# Patient Record
Sex: FEMALE | Race: WHITE
[De-identification: names, ages, dates, MRNs, and addresses within clinical notes are randomized per-mention and may not be internally consistent; named-entity substitution may affect disease eponyms.]

---

## 2020-06-27 ENCOUNTER — HOSPITAL ENCOUNTER (EMERGENCY)
Dept: HOSPITAL 96 - M.ERS | Age: 50
Discharge: HOME | End: 2020-06-27
Payer: COMMERCIAL

## 2020-06-27 VITALS — WEIGHT: 139.99 LBS | BODY MASS INDEX: 25.76 KG/M2 | HEIGHT: 62 IN

## 2020-06-27 VITALS — DIASTOLIC BLOOD PRESSURE: 93 MMHG | SYSTOLIC BLOOD PRESSURE: 186 MMHG

## 2020-06-27 DIAGNOSIS — N39.0: Primary | ICD-10-CM

## 2020-06-27 DIAGNOSIS — R11.2: ICD-10-CM

## 2020-06-27 DIAGNOSIS — Z88.8: ICD-10-CM

## 2020-06-27 DIAGNOSIS — N28.82: ICD-10-CM

## 2020-06-27 DIAGNOSIS — D25.9: ICD-10-CM

## 2020-06-27 DIAGNOSIS — Z88.6: ICD-10-CM

## 2020-06-27 LAB
ABSOLUTE BASOPHILS: 0.1 THOU/UL (ref 0–0.2)
ABSOLUTE EOSINOPHILS: 0.1 THOU/UL (ref 0–0.7)
ABSOLUTE MONOCYTES: 0.6 THOU/UL (ref 0–1.2)
ALBUMIN SERPL-MCNC: 4.1 G/DL (ref 3.4–5)
ALP SERPL-CCNC: 112 U/L (ref 46–116)
ALT SERPL-CCNC: 77 U/L (ref 30–65)
ANION GAP SERPL CALC-SCNC: 7 MMOL/L (ref 7–16)
AST SERPL-CCNC: 70 U/L (ref 15–37)
BACTERIA-REFLEX: (no result) /HPF
BASOPHILS NFR BLD AUTO: 1 %
BILIRUB SERPL-MCNC: 0.4 MG/DL
BILIRUB UR-MCNC: (no result) MG/DL
BUN SERPL-MCNC: 14 MG/DL (ref 7–18)
CALCIUM OXALATE: (no result) /LPF
CALCIUM SERPL-MCNC: 9.1 MG/DL (ref 8.5–10.1)
CHLORIDE SERPL-SCNC: 102 MMOL/L (ref 98–107)
CO2 SERPL-SCNC: 30 MMOL/L (ref 21–32)
COLOR UR: (no result)
CREAT SERPL-MCNC: 1.1 MG/DL (ref 0.6–1.3)
EOSINOPHIL NFR BLD: 1.7 %
GLUCOSE SERPL-MCNC: 159 MG/DL (ref 70–99)
GRANULOCYTES NFR BLD MANUAL: 73.7 %
HCT VFR BLD CALC: 47 % (ref 37–47)
HGB BLD-MCNC: 16.3 GM/DL (ref 12–15)
ICTOTEST (BILI CONFIRMATORY): (no result)
KETONES UR STRIP-MCNC: NEGATIVE MG/DL
LIPASE: 208 U/L (ref 73–393)
LYMPHOCYTES # BLD: 1.4 THOU/UL (ref 0.8–5.3)
LYMPHOCYTES NFR BLD AUTO: 16.8 %
MCH RBC QN AUTO: 31.7 PG (ref 26–34)
MCHC RBC AUTO-ENTMCNC: 34.6 G/DL (ref 28–37)
MCV RBC: 91.5 FL (ref 80–100)
MONOCYTES NFR BLD: 6.8 %
MPV: 6.5 FL. (ref 7.2–11.1)
MUCUS: (no result) STRN/LPF
NEUTROPHILS # BLD: 6.3 THOU/UL (ref 1.6–8.1)
NUCLEATED RBCS: 0 /100WBC
PLATELET COUNT*: 452 THOU/UL (ref 150–400)
POTASSIUM SERPL-SCNC: 4.4 MMOL/L (ref 3.5–5.1)
PROT SERPL-MCNC: 8.4 G/DL (ref 6.4–8.2)
PROT UR QL STRIP: (no result)
RBC # BLD AUTO: 5.13 MIL/UL (ref 4.2–5)
RBC # UR STRIP: (no result) /UL
RBC #/AREA URNS HPF: (no result) /HPF (ref 0–2)
RDW-CV: 13.5 % (ref 10.5–14.5)
SODIUM SERPL-SCNC: 139 MMOL/L (ref 136–145)
SP GR UR STRIP: >= 1.03 (ref 1–1.03)
SQUAMOUS: (no result) /LPF (ref 0–3)
URINE CLARITY: (no result)
URINE GLUCOSE-RANDOM: NEGATIVE
URINE LEUKOCYTES-REFLEX: (no result)
URINE NITRITE-REFLEX: NEGATIVE
UROBILINOGEN UR STRIP-ACNC: 0.2 E.U./DL (ref 0.2–1)
WBC # BLD AUTO: 8.6 THOU/UL (ref 4–11)

## 2020-06-28 NOTE — EKG
[place] [address]
Phone:  [phone]                     ELECTROCARDIOGRAM REPORT      
_______________________________________________________________________________
 
Name:         NICOLE BRAXTON               Room:                     [facility]#:    [id number]     Account #:     [id number]  
Admission:    20    Attend Phys:                     
Discharge:    20    Date of Birth: [birth year]  
Date of Service: 20 1442  Report #:      [id number]
        [id number]
_______________________________________________________________________________
THIS REPORT FOR:  //name//                      
 
                         [facility] ED
                                       
Test Date:    2020               Test Time:    14:42:13
Pat Name:     NICOLE BRAXTON          Department:   
Patient ID:   [id number]            Room:          
Gender:                               Technician:   GREGORY
:          [id number]               Requested By: Shannon Laguerre
Order Number: [id number] MD:   Deandre Anthony
                                 Measurements
Intervals                              Axis          
Rate:         79                       P:            48
MI:           148                      QRS:          -11
QRSD:         73                       T:            29
QT:           413                                    
QTc:          474                                    
                           Interpretive Statements
Sinus rhythm
No previous ECG available for comparison
Electronically Signed On 2020 7:37:01 CDT by Deandre Anthony
[email or web address]
 
 
 
 
 
 
 
 
 
 
 
 
 
 
 
 
 
 
 
 
 
 
  <ELECTRONICALLY SIGNED>
                                           By: Deandre Anthony MD, [facility]   
  20     0737
D: 20 1442   _____________________________________
T: 20   Deandre Anthony MD, FACC     /EPI

## 2021-04-20 ENCOUNTER — HOSPITAL ENCOUNTER (EMERGENCY)
Facility: MEDICAL CENTER | Age: 51
End: 2021-04-20
Attending: EMERGENCY MEDICINE

## 2021-04-20 VITALS
HEIGHT: 62 IN | HEART RATE: 102 BPM | WEIGHT: 173.94 LBS | SYSTOLIC BLOOD PRESSURE: 159 MMHG | OXYGEN SATURATION: 94 % | BODY MASS INDEX: 32.01 KG/M2 | TEMPERATURE: 98 F | DIASTOLIC BLOOD PRESSURE: 110 MMHG | RESPIRATION RATE: 14 BRPM

## 2021-04-20 PROCEDURE — 302449 STATCHG TRIAGE ONLY (STATISTIC)

## 2021-04-20 RX ORDER — LEVOTHYROXINE SODIUM 0.12 MG/1
125 TABLET ORAL
COMMUNITY

## 2021-04-20 NOTE — ED TRIAGE NOTES
Chelita Bran  50 y.o.  Chief Complaint   Patient presents with   • Digit Pain     right hand finger contractures begining 2 hours ago.    • Numbness     whole face, not unilateral, intermittenly for 6 months.   • Lip Swelling     started about a month ago     Patient to triage with above complaint. Pt reports recently moving here and being out of thyroid medication.  Pt recently DX with thyroid tumor.   Vitals:    04/20/21 1433   BP: (!) 173/111   Pulse: (!) 108   Resp: 16   Temp: 36.1 °C (97 °F)   SpO2: 96%     Triage process explained to patient, apologized for wait time, and returned to lobby.  Pt informed to notify staff of any change in condition. NAD at this time.

## 2021-04-21 NOTE — ED NOTES
Rounded on pt: pt not in room, no belongings in room, gown left on bed. Pt not in bathroom, surrounding hallways or lobby. Staff notified without locating patient. ERP updated, had not seen pt. Rechecked approx 10 min later, pt unable to be found.

## 2021-04-21 NOTE — ED TRIAGE NOTES
"Patient vital signs rechecked and documented per [facility]. Patient denies any new needs at this time.  Patient updated on wait times, thanked for patience. Pt informed to alert triage tech or triage RN with any needs and/or changes in condition; patient verbalized understanding. Patient stated \"I can actually move my hand better now.\"   "

## 2021-04-21 NOTE — ED NOTES
Amb to rm with steady gait. Pt reports contracture to R hand today (resolved at this time), intermittent numbness to lower face, worse to lips, x 6 months. Placed in gown, chart up for ERP.

## 2021-04-28 ENCOUNTER — HOSPITAL ENCOUNTER (EMERGENCY)
Dept: HOSPITAL 8 - ED | Age: 51
Discharge: HOME | End: 2021-04-28
Payer: SELF-PAY

## 2021-04-28 VITALS — SYSTOLIC BLOOD PRESSURE: 166 MMHG | DIASTOLIC BLOOD PRESSURE: 114 MMHG

## 2021-04-28 VITALS — WEIGHT: 171.96 LBS | HEIGHT: 62 IN | BODY MASS INDEX: 31.64 KG/M2

## 2021-04-28 DIAGNOSIS — Z76.0: ICD-10-CM

## 2021-04-28 DIAGNOSIS — E03.9: ICD-10-CM

## 2021-04-28 DIAGNOSIS — I10: Primary | ICD-10-CM

## 2021-04-28 PROCEDURE — 99281 EMR DPT VST MAYX REQ PHY/QHP: CPT

## 2021-04-28 NOTE — NUR
bp recheck of 166/114 reviewed by sandra yoder md declines to order rx for bp as 
pt declines when offered. pt requesting to follow up with pcp for management. 
pt is a&o, repss even and unlabored. pt asymtomatic. pt given dc instructions 
and script for levothroxine. pt ambulatory to dc desk with steady gait, all 
questions answered.

## 2021-04-28 NOTE — NUR
pt presents to ED sent from urgent care. pt was seen at urgent care to obtain 
synthroid rx as she does not currently have a pcp in town, ran out of synthroid 
2 months ago. pt denies cp, denies headache, denies visual changes, denies sob. 
neuro intact. pt a&o, resps even and unlabored. all monitors in place, sinus 
tach on cardiac monitor with no ectopy, rate 100s. awaiting provider and dispo.

## 2021-06-04 ENCOUNTER — HOSPITAL ENCOUNTER (EMERGENCY)
Dept: HOSPITAL 8 - ED | Age: 51
Discharge: HOME | End: 2021-06-04
Payer: SELF-PAY

## 2021-06-04 VITALS — DIASTOLIC BLOOD PRESSURE: 85 MMHG | SYSTOLIC BLOOD PRESSURE: 137 MMHG

## 2021-06-04 VITALS — WEIGHT: 165.35 LBS | BODY MASS INDEX: 30.43 KG/M2 | HEIGHT: 62 IN

## 2021-06-04 DIAGNOSIS — R00.2: ICD-10-CM

## 2021-06-04 DIAGNOSIS — R11.2: Primary | ICD-10-CM

## 2021-06-04 DIAGNOSIS — I10: ICD-10-CM

## 2021-06-04 DIAGNOSIS — E87.6: ICD-10-CM

## 2021-06-04 DIAGNOSIS — E03.9: ICD-10-CM

## 2021-06-04 LAB
ALBUMIN SERPL-MCNC: 3.5 G/DL (ref 3.4–5)
ALP SERPL-CCNC: 119 U/L (ref 45–117)
ALT SERPL-CCNC: 84 U/L (ref 12–78)
ANION GAP SERPL CALC-SCNC: 13 MMOL/L (ref 5–15)
BASOPHILS # BLD AUTO: 0 X10^3/UL (ref 0–0.1)
BASOPHILS NFR BLD AUTO: 1 % (ref 0–1)
BILIRUB SERPL-MCNC: 0.6 MG/DL (ref 0.2–1)
CALCIUM SERPL-MCNC: 8.7 MG/DL (ref 8.5–10.1)
CHLORIDE SERPL-SCNC: 100 MMOL/L (ref 98–107)
CREAT SERPL-MCNC: 0.89 MG/DL (ref 0.55–1.02)
EOSINOPHIL # BLD AUTO: 0 X10^3/UL (ref 0–0.4)
EOSINOPHIL NFR BLD AUTO: 1 % (ref 1–7)
ERYTHROCYTE [DISTWIDTH] IN BLOOD BY AUTOMATED COUNT: 14.9 % (ref 9.6–15.2)
LYMPHOCYTES # BLD AUTO: 2.3 X10^3/UL (ref 1–3.4)
LYMPHOCYTES NFR BLD AUTO: 29 % (ref 22–44)
MCH RBC QN AUTO: 33.8 PG (ref 27–34.8)
MCHC RBC AUTO-ENTMCNC: 35.2 G/DL (ref 32.4–35.8)
MD: NO
MONOCYTES # BLD AUTO: 0.7 X10^3/UL (ref 0.2–0.8)
MONOCYTES NFR BLD AUTO: 9 % (ref 2–9)
NEUTROPHILS # BLD AUTO: 4.9 X10^3/UL (ref 1.8–6.8)
NEUTROPHILS NFR BLD AUTO: 61 % (ref 42–75)
PLATELET # BLD AUTO: 316 X10^3/UL (ref 130–400)
PMV BLD AUTO: 7.4 FL (ref 7.4–10.4)
PROT SERPL-MCNC: 7.2 G/DL (ref 6.4–8.2)
RBC # BLD AUTO: 5.03 X10^6/UL (ref 3.82–5.3)
T4 FREE SERPL-MCNC: 0.73 NG/DL (ref 0.76–1.46)

## 2021-06-04 PROCEDURE — 84443 ASSAY THYROID STIM HORMONE: CPT

## 2021-06-04 PROCEDURE — 83690 ASSAY OF LIPASE: CPT

## 2021-06-04 PROCEDURE — 96372 THER/PROPH/DIAG INJ SC/IM: CPT

## 2021-06-04 PROCEDURE — 85025 COMPLETE CBC W/AUTO DIFF WBC: CPT

## 2021-06-04 PROCEDURE — 99285 EMERGENCY DEPT VISIT HI MDM: CPT

## 2021-06-04 PROCEDURE — 36415 COLL VENOUS BLD VENIPUNCTURE: CPT

## 2021-06-04 PROCEDURE — 96374 THER/PROPH/DIAG INJ IV PUSH: CPT

## 2021-06-04 PROCEDURE — 84439 ASSAY OF FREE THYROXINE: CPT

## 2021-06-04 PROCEDURE — 80053 COMPREHEN METABOLIC PANEL: CPT

## 2021-06-04 PROCEDURE — 96361 HYDRATE IV INFUSION ADD-ON: CPT

## 2021-06-04 NOTE — NUR
PT TALKING ON CELL PHONE IN ROOM. VS STABLE. NO ACUTE DISTRESS NOTED. CALL 
LIGHT IN PLACE. WILL CONTINUE TO MONITOR.

## 2021-06-04 NOTE — NUR
PT C/O N/V. PT HAS BEEN SEEN BY DR SMITH. VS STABLE. NO ACUTE DISTRESS NOTED. 
CALL LIGHT IN PLACE. WILL CONTINUE TO MONITOR.